# Patient Record
(demographics unavailable — no encounter records)

---

## 2024-11-27 NOTE — ASSESSMENT
[FreeTextEntry1] : Exam today shows purulence from patent left PE tube.  Left tympanic membrane intact with stable thickening.  Culture taken of right ear, Rx Ciprodex drops, will await culture results before starting oral antibiotics.  Follow-up 3 to 4 weeks.

## 2024-11-27 NOTE — HISTORY OF PRESENT ILLNESS
[de-identified] : 59 y/o F here for f/u visit for ear drainage States 1 month hx of right ear drainage (green color). Patient with concerns with decreased hearing in right ear Patient states sounds are muffled in right ear. Patient states ringing in right ear. Wears hearing aid left ear Denies otalgia, ear infections, dizziness, vertigo, headaches related to hearing.

## 2024-11-27 NOTE — CONSULT LETTER
[Dear  ___] : Dear  [unfilled], [Consult Letter:] : I had the pleasure of evaluating your patient, [unfilled]. [Consult Closing:] : Thank you very much for allowing me to participate in the care of this patient.  If you have any questions, please do not hesitate to contact me. [Sincerely,] : Sincerely, [FreeTextEntry2] : Dr. Ian Gagnon [FreeTextEntry3] : Roshan Michele MD      Pediatric Otolaryngology      [address] [address]      Tel [phone]      Fax [phone]

## 2024-12-28 NOTE — ADDENDUM
[FreeTextEntry1] : Recorded by Selvin Ding acting as a scribe for Dr. Ian Gagnon M.D, on 12/27/2024  All medical record entries made by the Scribe were at my, Dr. Ian Gagnon M.D., direction and personally dictated by me on 12/27/2024. I have reviewed the chart and agree that the record accurately reflects my personal performance of the history, physical exam, assessment and plan. I have also personally directed, reviewed, and agreed with the chart.

## 2024-12-28 NOTE — HISTORY OF PRESENT ILLNESS
[TextBox_4] : History of bronchiectasis and Kartagener syndrome  Increased cough and phlegm No fever or chills not short of breath

## 2024-12-28 NOTE — ASSESSMENT
[FreeTextEntry1] : Angiectasis exacerbation.  Start course of antibiotics and steroids.  Encouraged to use albuterol via nebulizer check flu swab

## 2025-01-06 NOTE — HISTORY OF PRESENT ILLNESS
[TextBox_4] : 60 year old female with History of bronchiectasis and Kartagener syndrome, presents for follow up office visit regarding further pulmonary evaluation. Had exacerbation of symptoms treated with steroids and antibiotics Pt notes after recent antibiotic use she felt her blurry vision increased, increased pressure, and headache. Pt continues to note increased pressure and headaches but otherwise she feels much better. Pt continues to note production of phlegm.

## 2025-01-06 NOTE — ADDENDUM
[FreeTextEntry1] : I, Roosevelt Jada, acted solely as a scribe for Dr. Ian Gagnon M.D. on this date 01/06/2025.   All medical record entries made by the Scribe were at my, Dr. Ian Gagnon M.D., direction and personally dictated by me on 01/06/2025. I have reviewed the chart and agree that the record accurately reflects my personal performance of the history, physical exam, assessment and plan. I have also personally directed, reviewed, and agreed with the chart.

## 2025-01-06 NOTE — ASSESSMENT
[FreeTextEntry1] : For pt sinusitis symptoms pt should start taking the decongestion pseudoephedrine 30mg 2-3 times a day p.r.n. to remove her sinus pressure.  Due to patients increased symptoms and the fact she is continuing to recover she should hold off on receiving Ibandronate injection for another 3 weeks until her symptoms are fully resolved. Follow up appointment in 3 weeks to perform medical Ibandronate injection.

## 2025-01-06 NOTE — PHYSICAL EXAM
[No Acute Distress] : no acute distress [Normal Oropharynx] : normal oropharynx [Normal Appearance] : normal appearance [No Neck Mass] : no neck mass [Normal Rate/Rhythm] : normal rate/rhythm [Normal S1, S2] : normal s1, s2 [No Murmurs] : no murmurs [No Resp Distress] : no resp distress [Clear to Auscultation Bilaterally] : clear to auscultation bilaterally [No Abnormalities] : no abnormalities [Benign] : benign [Normal Gait] : normal gait [No Clubbing] : no clubbing [No Cyanosis] : no cyanosis [No Edema] : no edema [FROM] : FROM [Normal Color/ Pigmentation] : normal color/ pigmentation [No Focal Deficits] : no focal deficits [Oriented x3] : oriented x3 [Normal Affect] : normal affect [Rhonchi] : rhonchi

## 2025-02-02 NOTE — PROCEDURE
[FreeTextEntry3] : Procedure note:  Binocular microscopy  Jan 31, 2025   Inspection of the ears was performed under binocular microscopy because of need to accurately visualize otologic landmarks and potential pathologic conditions that would not otherwise be visible through simple otoscopic exam. [de-identified] : Procedure note: Nasal endoscopy  Jan 31, 2025   Description of Procedure:  Nasal endoscopy was performed because of recalcitrant symptoms of nasal obstruction and/or chronic rhinitis, and anterior anatomic abnormalities precluding visualization. Using a flexible endoscope with sheath, the nasal mucosa, septum, turbinates, and ostiomeatal complex were examined.    Nasal mucosa findings:  the nasal mucosa was edematous. Septum findings:  the septum showed no abnormalities. Nasopharynx findings:  the nasopharynx was normal. Middle meatus findings:  the middle meatus had no abnormalities. Sinuses findings:  the paranasal sinuses had no abnormalities.

## 2025-02-02 NOTE — HISTORY OF PRESENT ILLNESS
[de-identified] : 59 y/o F here for f/u visit for ear drainage Last seen 11/27/24- right ear culture obtained- ciprodex ordered   Reports recent URI and sinus infection late 12/2024. She was seen by Pulm Dr. Gagnon at which time was treated with prednisone and doxycycline. She states both medications made her feel nauseous, increased congestion and causes insomnia. She then started a OTC nasal decongestant with some relief  Reports sensation of feeling "off balance" for one month lasting for 4-5 hours per day  Reports echo to bilateral ears when speaking  Reports intermittent tinnitus, non-pulsatile  States she currently has nasal congestion and sinus pressure/pain.  No otorrhea or otalgia  Hearing loss is stable- continues to wear left hearing aid.  Denies recent falls, spinning sensation, and recent fevers

## 2025-02-02 NOTE — PROCEDURE
[FreeTextEntry3] : Procedure note:  Binocular microscopy  Jan 31, 2025   Inspection of the ears was performed under binocular microscopy because of need to accurately visualize otologic landmarks and potential pathologic conditions that would not otherwise be visible through simple otoscopic exam. [de-identified] : Procedure note: Nasal endoscopy  Jan 31, 2025   Description of Procedure:  Nasal endoscopy was performed because of recalcitrant symptoms of nasal obstruction and/or chronic rhinitis, and anterior anatomic abnormalities precluding visualization. Using a flexible endoscope with sheath, the nasal mucosa, septum, turbinates, and ostiomeatal complex were examined.    Nasal mucosa findings:  the nasal mucosa was edematous. Septum findings:  the septum showed no abnormalities. Nasopharynx findings:  the nasopharynx was normal. Middle meatus findings:  the middle meatus had no abnormalities. Sinuses findings:  the paranasal sinuses had no abnormalities.

## 2025-02-02 NOTE — HISTORY OF PRESENT ILLNESS
[de-identified] : 59 y/o F here for f/u visit for ear drainage Last seen 11/27/24- right ear culture obtained- ciprodex ordered   Reports recent URI and sinus infection late 12/2024. She was seen by Pulm Dr. Gagnon at which time was treated with prednisone and doxycycline. She states both medications made her feel nauseous, increased congestion and causes insomnia. She then started a OTC nasal decongestant with some relief  Reports sensation of feeling "off balance" for one month lasting for 4-5 hours per day  Reports echo to bilateral ears when speaking  Reports intermittent tinnitus, non-pulsatile  States she currently has nasal congestion and sinus pressure/pain.  No otorrhea or otalgia  Hearing loss is stable- continues to wear left hearing aid.  Denies recent falls, spinning sensation, and recent fevers     Infliximab Pregnancy And Lactation Text: This medication is Pregnancy Category B and is considered safe during pregnancy. It is unknown if this medication is excreted in breast milk.

## 2025-02-02 NOTE — ASSESSMENT
[FreeTextEntry1] : Exam today again shows purulent drainage from patent right PE tube.  Left tympanic membrane reconstruction intact.  Nasal endoscopy shows thickened secretions bilaterally.  Begin clindamycin for acute on chronic sinusitis, culture of right ear taken, switch Ciprodex to moxifloxacin drops.

## 2025-02-19 NOTE — PROCEDURE
[FreeTextEntry1] : Spirometry unchanged Ibandronate infusion performed intravenously.  Brief episode of nausea otherwise tolerated well Venipuncture for lab Preop labs reviewed

## 2025-02-19 NOTE — ASSESSMENT
[FreeTextEntry1] : No medical or pulmonary contraindication to colonoscopy/anesthesia Continue ibandronate infusions for osteoporosis

## 2025-02-19 NOTE — HISTORY OF PRESENT ILLNESS
[TextBox_4] : Patient here for multiple reasons 1) preop clearance for colonoscopy 2) here to receive ibandronate infusion for osteoporosis Pulmonary status unchanged denies worsening cough or shortness of breath.

## 2025-02-19 NOTE — ADDENDUM
[FreeTextEntry1] : Recorded by Selvin Ding acting as a scribe for Dr. Ian Gagnon M.D, on 02/18/2025  All medical record entries made by the Scribe were at my, Dr. Ian Gagnon M.D., direction and personally dictated by me on 02/18/2025. I have reviewed the chart and agree that the record accurately reflects my personal performance of the history, physical exam, assessment and plan. I have also personally directed, reviewed, and agreed with the chart.

## 2025-02-19 NOTE — REASON FOR VISIT
[Follow-Up] : a follow-up visit [Cough] : cough [Other: _____] : [unfilled] [Asthma] : asthma [Bronchiectasis] : bronchiectasis [Pre-op Risk Stratification] : pre-op risk stratification [TextBox_44] : Osteoporosis

## 2025-02-27 NOTE — PROCEDURE
[Anterior rhinoscopy insufficient to account for symptoms] : anterior rhinoscopy insufficient to account for symptoms [None] : none [Rigid Endoscope] : examined with a rigid endoscope [de-identified] : Regan Sweeney [de-identified] : Procedure: Bilateral nasal endoscopy (CPT 83858)   Indication: Anterior rhinoscopy was inadequate to evaluate pathology.  Left: Septum midline Extensive thick mucus throughout paranasal sinuses and nasal floor  Moderate inferior turbinate hypertrophy  MM with thick mucus  edema in ethmoid cavity   Right:  Septum midline Extensive thick mucus throughout paranasal sinuses and nasal floor  MM with thick mucus  edema in ethmoid cavity

## 2025-02-27 NOTE — PHYSICAL EXAM
[Nasal Endoscopy Performed] : nasal endoscopy was performed, see procedure section for findings [Normal] :  tongue is normal [de-identified] : thin otorrhea in R [de-identified] : L Ã?Â? with effusion

## 2025-02-27 NOTE — PLAN
[TextEntry] : Continue budesonide irrigations. I will call her with the results of the culture.   We discussed the possibility of completion sinus surgery given her history of PCD.

## 2025-02-27 NOTE — HISTORY OF PRESENT ILLNESS
[de-identified] : Update 2/27/25: 60F with hx of PCD/kartegeners previously seen for CRS. She continues irrigations, which are not as effective as they once were. She reports about 2 month hx of sinus pressure/pain, HA, and bilateral ear clogging with otorrhea. She had an infection in December and received abx/steroids however did not help. Saw then Dr Armando and prescribed clindamycin and moxifloxacin which has helped. Using budesonide and mucinex daily.   59 year old female presents for follow up of sinus issues. Hx of Kartagener's syndrome - pt reports constant thick mucus Hx of right tube, tube is still in place, otorrhea improved after tobradex but present Does sinus rinses every 3 days  Complains of PND - takes mucinex and uses Budesonide rinses every other week  Denies nasal congestion, recent sinus infections, sinus pain or pressure, recent fevers

## 2025-05-28 NOTE — PROCEDURE
[FreeTextEntry3] : Procedure note:  Binocular microscopy  May 28, 2025   Inspection of the ears was performed under binocular microscopy because of need to accurately visualize otologic landmarks and potential pathologic conditions that would not otherwise be visible through simple otoscopic exam.

## 2025-05-28 NOTE — ASSESSMENT
[FreeTextEntry1] : Exam shows scant drainage adjacent to a patent PE tube on right side.  Left tympanic membrane intact.  Assessment is worsened right chronic otorrhea, chronic otitis media.  Rx moxifloxacin x 7 days, new culture taken today.  Follow-up 3 months assuming drainage improves.

## 2025-05-28 NOTE — HISTORY OF PRESENT ILLNESS
[de-identified] :  61 y/o F here for f/u visit for ear drainage Last seen 01/31/25- chronic sinusitis, culture of right ear taken, switch Ciprodex to moxifloxacin drops.  Completed course of antibiotics with great relief. No recent URI's Currently has right ear otorrhea for one day that is green/oranage in color.  No recent "off balance" episodes.  No longer experiencing echoing to bilateral ears when speaking States intermittent tinnitus, non-pulsatile has also greatly improved  No otorrhea or otalgia Hearing loss is stable- continues to wear left hearing aid. Denies recent falls, spinning sensation, and recent fevers

## 2025-06-20 NOTE — HISTORY OF PRESENT ILLNESS
[TextBox_4] : Here to receive ibandronate infusion for osteoporosis Pulmonary status unchanged denies worsening cough or shortness of breath. Continues to complain of easy bruising Seen by GI and being started on omeprazole for reflux

## 2025-06-20 NOTE — REASON FOR VISIT
[Follow-Up] : a follow-up visit [Asthma] : asthma [Bronchiectasis] : bronchiectasis [TextBox_44] : Osteoporosis

## 2025-06-20 NOTE — ADDENDUM
[FreeTextEntry1] : I, Luz Daley, acted solely as a scribe for Dr. Ian Gagnon M.D. on this date 06/20/2025.   All medical record entries made by the Scribe were at my, Dr. Ian Gagnon M.D., direction and personally dictated by me on 06/20/2025. I have reviewed the chart and agree that the record accurately reflects my personal performance of the history, physical exam, assessment and plan. I have also personally directed, reviewed, and agreed with the chart.

## 2025-06-20 NOTE — ASSESSMENT
[FreeTextEntry1] : Continue ibandronate infusion for osteoporosis Agree with antireflux therapy Hopefully with additional antireflux therapy we might be able to cut down some of her asthma medicine Follow-up for physical 1 month